# Patient Record
Sex: FEMALE | Race: WHITE
[De-identification: names, ages, dates, MRNs, and addresses within clinical notes are randomized per-mention and may not be internally consistent; named-entity substitution may affect disease eponyms.]

---

## 2020-08-13 ENCOUNTER — HOSPITAL ENCOUNTER (INPATIENT)
Dept: HOSPITAL 35 - SBH | Age: 75
LOS: 11 days | Discharge: NURSING, REHAB OR LONG-TERM CARE | DRG: 884 | End: 2020-08-24
Attending: PSYCHIATRY & NEUROLOGY | Admitting: PSYCHIATRY & NEUROLOGY
Payer: COMMERCIAL

## 2020-08-13 VITALS — DIASTOLIC BLOOD PRESSURE: 66 MMHG | SYSTOLIC BLOOD PRESSURE: 144 MMHG

## 2020-08-13 VITALS — DIASTOLIC BLOOD PRESSURE: 68 MMHG | SYSTOLIC BLOOD PRESSURE: 153 MMHG

## 2020-08-13 VITALS — DIASTOLIC BLOOD PRESSURE: 57 MMHG | SYSTOLIC BLOOD PRESSURE: 114 MMHG

## 2020-08-13 VITALS — WEIGHT: 87.81 LBS | HEIGHT: 61 IN | BODY MASS INDEX: 16.58 KG/M2

## 2020-08-13 DIAGNOSIS — F32.9: ICD-10-CM

## 2020-08-13 DIAGNOSIS — E44.0: ICD-10-CM

## 2020-08-13 DIAGNOSIS — F01.50: Primary | ICD-10-CM

## 2020-08-13 DIAGNOSIS — R45.851: ICD-10-CM

## 2020-08-13 DIAGNOSIS — Z79.82: ICD-10-CM

## 2020-08-13 DIAGNOSIS — F41.9: ICD-10-CM

## 2020-08-13 DIAGNOSIS — F29: ICD-10-CM

## 2020-08-13 DIAGNOSIS — I10: ICD-10-CM

## 2020-08-13 DIAGNOSIS — E78.5: ICD-10-CM

## 2020-08-13 DIAGNOSIS — Z20.828: ICD-10-CM

## 2020-08-13 DIAGNOSIS — Z79.899: ICD-10-CM

## 2020-08-13 LAB
ALBUMIN SERPL-MCNC: 3.9 G/DL (ref 3.4–5)
ALT SERPL-CCNC: 31 U/L (ref 30–65)
AST SERPL-CCNC: 22 U/L (ref 15–37)
BILIRUB DIRECT SERPL-MCNC: 0.1 MG/DL
BILIRUB SERPL-MCNC: 0.4 MG/DL (ref 0.2–1)
PROT SERPL-MCNC: 7.6 G/DL (ref 6.4–8.2)

## 2020-08-13 PROCEDURE — 10880: CPT

## 2020-08-13 NOTE — NUR
PATIENT WAS IN BED SLEEPING WHEN CARE ASSUMED. WHEN AWOKEN, PATIENT CAME OUT
TO DAY ROOM AMBULATING WITH ASSIST OF ROLLER WALKER, GAIT SLIGHTLY UNSTEADY.
HOSITALIST DR. UMANA CONSULTED, PATIENT WELCOME PACK GIVEN TO HER. APPETITE IS
GOOD, PATIENT IS EATING 50%-75% MEALS. PATIENT TOOK ALL MEDICATION WHOLE
WITHOUT DIFFICULTY. PATIENT IS ALERT, AND ORIENTED X 2-3, ABLE TO VOICE NEED.
PATIENT DENIES SUICIDAL/HOMICIDAL IDEATION, SHE RATED DEPRESSION/ANXIETY
9/10. PATIENT DENIES AUDITORY/VISUAL HALLUCINATION, DENIES HAVING PHYSICAL
PAIN. PATIENT IS IMPULSIVE, RESTLESS, WANTING STAFF TO CONTINIOUSLY WALK WITH
HER IN THE PITTS. THOUGHT PROCESS DISORGANIZED, AND RACING. PATIENT DID
PARTICIPATE IN GROUP THERAPY. NO SIGN OF ACUTE DISTRESS NOTED AT THIS TIME,
WILL MONITOR.
FOR SAFETY.

## 2020-08-13 NOTE — NUR
Patient arrived at 0430 am via stretcher from [facility]. Patients is alert and
oriented x3. Patient denies hi/si. Patient is in with anxiety and depression.
Patient is accepting and is here voluntarily to get medications adjusted.
Patient ambulates with a walker. Patient denies pain at this time. Patients
assessment shows stable vital signs. Patient is willing to participate in
activities. Patients consents signed. We will continue to monitor per hospital
policy.

## 2020-08-14 VITALS — SYSTOLIC BLOOD PRESSURE: 115 MMHG | DIASTOLIC BLOOD PRESSURE: 63 MMHG
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
## 2020-08-14 NOTE — NUR
DESIRAE [facility] with [facility]. She gave the fax number of
[phone]. DESIRAE asked if they had DPOA docs for pt and she said they did; she
will fax to DESIRAE. She also gave pt's DPOA info of Yoni Uribe (Bill) at
[phone].
 
DESIRAE contacted Jin and was told by his wife that he will call DESIRAE back.
 
DESIRAE team will continue to follow pt during her stay on this unit.

## 2020-08-14 NOTE — NUR
Assess due to low BMI of 16.6.  admitted to [facility] for anxiety, depression, and
acute psychosis.  Pt impulsive, possible wt loss 2-13 lb indicated prior
admission.  Has carb control diet order, however no indication in pts hx, not
on diabetic medications and no accuchecks.  Will change diet to regular.  Alb
wnl, has been eating % meals since admission.  Low nutrition risk and
will continue to follow wt trends, intake trends weekly.

## 2020-08-15 VITALS — SYSTOLIC BLOOD PRESSURE: 133 MMHG | DIASTOLIC BLOOD PRESSURE: 52 MMHG

## 2020-08-15 VITALS — SYSTOLIC BLOOD PRESSURE: 119 MMHG | DIASTOLIC BLOOD PRESSURE: 70 MMHG

## 2020-08-15 NOTE — NUR
0700 ASSUMED CARE OF PATIENT, PATIENT IN BED AT THAT TIME.
0800 PATIENT REFUSING TO GET OUT OF BED FOR BREAKFAST FOR PCA.
0830 WRITER IN TO ROOM ASKED PT TO COME OUT FOR BREAKFAST, PATIENT UP TO BR
VOIDED X1 THEN OUT TO DAYROOM TO EAT. PATIENT ATE 50% OF MEAL. PATIENT SITS IN
DAYROOM FOR ANOTHER 15 MIN AFTER EATING THEN BACK TO ROOM. WRITER ASSISTED
PATIENT IN CHOOSING FOOD FOR LUNCH AS SHE REFUSES TO EAT ANYTHING HARD.
PATIENT IN BED AND ASKS TO HAVE DOOR CLOSED AS MUCH AS POSSIBLE. NOISE
INCREASES PATIENT ANXIETY. NO C/O SI/HI/AH/VH. NO C/O PAIN. WILL CONTINUE TO
OBSERVE.

## 2020-08-15 NOTE — NUR
Assumed care of pt @ 1900. Pt calm et cooperative this shift. Took medications
whole without difficulty. Ambulates the halls with assistance of walker with
steady gait. VSWNL. Health assessment with no abnormalities noted at present
time. Denies SI/HI at present time. Socialized with peers in dayroom until HS.
Currently resting in bed with eyes closed. Will continue to monitor per
protocol.

## 2020-08-15 NOTE — EKG
[facility]
[address]
[place]   [address]                     ELECTROCARDIOGRAM REPORT      
_______________________________________________________________________________
 
Name:       WILLIS WAITE                  [facility]      ADM IN  
M.R.#:      [id number]                       Account #:      [id number]  
Admission:  20    Attend Phys:    Gino Starks DO
Discharge:              Date of Birth:  [birth year]  
                                                          Report #: [id number]
                                                                    [id number]
_______________________________________________________________________________
THIS REPORT FOR:  
 
cc:  VIDHYA HILL MD             
     Physician not on staff        
     Cory Orourke MD                                            ~
THIS REPORT FOR:   //name//                          
 
                          [facility]
                                       
Test Date:    2020               Test Time:    12:56:22
Pat Name:     WILLIS WAITE                Department:   
Patient ID:   [id number]            Room:         [facility]
Gender:       F                        Technician:   HUMBERTO GARCIA
:          1945               Requested By: Gino Starks
Order Number: [id number] MD:   Cory Orourke
                                 Measurements
Intervals                              Axis          
Rate:         84                       P:            20
OR:           161                      QRS:          -9
QRSD:         85                       T:            61
QT:           354                                    
QTc:          419                                    
                           Interpretive Statements
Sinus rhythm
No previous ECG available for comparison
Electronically Signed On 8- 11:35:33 CDT by Cory Orourke
[email or web address]
 
 
 
 
 
 
 
 
 
 
 
 
 
 
 
 
 
  <ELECTRONICALLY SIGNED>
   By: Cory Orourke MD        
  08/15/20     1135
D: 20 1256                           _____________________________________
T: 20 1256                           Cory Orourke MD          /KWASI

## 2020-08-15 NOTE — H
[facility]
[address]
[place]   [address]                     HISTORY AND PHYSICAL          
_______________________________________________________________________________
 
Name:       WILLIS WAITE                  Room #:         520A-A      ADM IN  
M.R.#:      [id number]                       Account #:      [id number]  
Admission:  20    Attend Phys:    Gino Starks DO
Discharge:              Date of Birth:  [birth year]  
                                                          Report #: [id number]
                                                                    [id number]   
_______________________________________________________________________________
THIS REPORT FOR:  
 
cc:  VIDHYA HILL MD             
     Physician not on staff                                               
     Gino Starks DO                                        ~
CC: Gino Starks
    Physician staff
    VIDHYA HILL
 
DATE OF SERVICE:  2020
 
 
INPATIENT PSYCHIATRIC EVALUATION
 
ATTENDING PSYCHIATRIST:  Gino Starks DO
 
MEDICAL CONSULTANT:  Stu Christianson MD and his team of hospitalist.
 
SOURCES OF INFORMATION:  Records from [facility], interview with the
patient.  The patient has a brother, Yoni Uribe, will
get an additional information
from who is her DPOA.
 
CHIEF COMPLAINT:  SI statements.
 
HISTORY OF PRESENT ILLNESS:  This is a 75-year-old  female who is in
assisted living in [facility].  EMS was called due to increase in anxiety and
making suicidal ideation statements.  She later denied to the Emergency Room
stating she is to start to make those kinds of statements.  It is noted she has
a long history of anxiety and depression.  She reports numerous psychiatric
hospitalizations though cannot recall where.  She knew the day was Thursday,
stated the year was , she knew it was August.  She had trouble remembering
fair amount of historic details.  Report from the nursing home, the patient
making suicidal threats to staff member, which was unable to be corroborated in
the ER.  She denied visual or auditory hallucinations.  No previous history of
suicide attempts.  She denied any chest pain, abdominal pain, shortness of
breath, fevers or chills.  Denies any illicit drug use and states she drinks 1-2
times a week.  Associated symptoms listed as anxiety.
 
PAST MEDICAL HISTORY:  Includes hyperlipidemia, hypertension.
 
PSYCHIATRIC HISTORY:  Anxiety, depression, psychosis.
 
PAST SURGICAL HISTORY:  Cyst removal.
 
SOCIAL HISTORY:  Denies ever smoking.  She denied alcohol, drug use to me. 
 
 
 
[facility]
[address]
[place]   [address]                     HISTORY AND PHYSICAL          
_______________________________________________________________________________
 
Name:       WILLIS WAITE JO                  Room #:         520A-A      ADM IN  
M.R.#:      [id number]                       Account #:      [id number]  
Admission:  20    Attend Phys:    Gino Starks DO
Discharge:              Date of Birth:  [birth year]  
                                                          Report #: [id number]
                                                                    [id number]   
_______________________________________________________________________________
Actually, she denied abuse of those, but does states she drinks 1 glass of wine
per week.
 
REVIEW OF SYSTEMS:  From the ER,
CONSTITUTIONAL:  Negative for chills or fever.
HEENT:  Negative for congestion.
EYES:  Negative for visual disturbances.
RESPIRATORY:  Negative for shortness of breath.
CARDIOVASCULAR:  Negative for chest pain.
GASTROINTESTINAL:  Negative for abdominal pain, nausea, vomiting.
GENITOURINARY:  Negative for dysuria or hematuria.
SKIN:  Negative for rash.
ALLERGIC/IMMUNOLOGIC:  Negative for immunocompromised state.
NEUROLOGIC:  Negative for headaches.
PSYCHIATRIC:  Positive for dysphoric mood, being anxious.
All other review of systems are negative on brief 10-point.
 
PHYSICAL EXAMINATION:  Grossly normal.
 
HOME MEDICATIONS:  Noted to be acetaminophen, alprazolam 0.25 mg, amlodipine 5
mg p.o. daily, aspirin 81 mg p.o. daily, atorvastatin 20 mg p.o. daily, calcium
with vitamin D 500 mg/200IU I think it is 3 times a day, clonazepam 0.5 mg
tablet, deutetrabenazine, so she has been treated for dyskinesia 12 mg p.o.
daily, Depakote 125 mg delayed release  twice a day, escitalopram 10 mg p.o.
daily, lisinopril oral daily, omega 3 fish oil 1000 mg capsule, Oxybutynin and
Seroquel.
 
The patient since here she is going to be discharged on a safety plan and return
back to facility; however, after reviewing from the nursing facility, they
recommended inpatient psychiatric hospitalization.  [place]
follows, this was done
by JENNIFER Kapoor at [facility].  She has had
increased hallucinations, anxiety, agitation, making statements that her anxiety
and depression are so bad that she wants to die.  She has repeatedly cancelled
her psychiatric appointments.  She is also refusing to eat and had a significant
weight loss.  She also has refused to go to any of her medical appointments. 
She has an extensive psychiatric history and needs to have her meds adjusted.  I
am concerned for her safety as she is in her apartment alone for extended
periods of time.  It is imperative to maintain
safety that she is admitted to an
inpatient psychiatric facility for medication adjustment and evaluation.
 
LABORATORY DATA:  Urine drug screen showed opiate,  otherwise not detected. 
Urinalysis was negative.  Electrolytes:  Sodium 139, potassium 3.7, chloride
103, bicarbonate 29, anion gap 7, calcium 9, glucose 122, BUN 17, creatinine
0.6, GFR female non-African-American 97.  Alcohol negative.  CBC:  White count
 
 
 
[facility]
[address]
[place]   [address]                     HISTORY AND PHYSICAL          
_______________________________________________________________________________
 
Name:       WILLIS WAITE                  Room #:         520A-A      ADM IN  
.R.#:      [id number]                       Account #:      [id number]  
Admission:  20    Attend Phys:    Gino Starks, DO
Discharge:              Date of Birth:  [birth year]  
                                                          Report #: [id number]
                                                                    [id number]   
_______________________________________________________________________________
7.02, H and H 12.4 and 37, platelet count 245.  There was a psychiatric
assessment that appears to have been done, but not offering a lot more
information than got from the physical.  She reported she has been at her
facility for 3 years, feel safe there.  She is a resident neighbor across [address]
which
she considers a friend.  She sleeps okay.  She has decreased appetite, poor
eyesight, misses reading books.  Actually, I did not get where she was born and
raised.  She was  in the early 70s,  ,  after that.  No
children.  She has 2 brothers.  Forgot to ask level of education.
 
VITAL SIGNS:  Today, temperature 36.1, pulse 62, respirations 18, /57, O2
sat 97%.  We will go ahead and order hepatic function panel as this was not on
the outside records that will give us albumin and LFTs.  I will also order B12
and vitamin D.  She does appear to be cognitively impaired so I will add on the
syphilis antibody.
GENERAL:  She ambulates with walker.  She does have dyskinesia in her 
left foot.
MUSCULOSKELETAL:  Abnormal gait, dyskinesia evident of the left foot, "curling
toes."
 
MENTAL STATUS EXAMINATION:  This is a well-developed, ill-appearing 
female appearing at least stated age.  Attention limited.  Concentration
limited.  Denied SI or HI.  Endorses anxiety.  Denied auditory, visual, or
tactile hallucinations.  Memory appears to be impaired.  SLUMS deferred.  Mood
dysphoric, anxious, restricted.  Insight limited.  Judgment limited.  Fund of
knowledge below average.
 
FORMULATION:  A 75-year-old  female set in for SI.
 
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
degree with psychotic features ,
unspecified anxiety, rule out major neurocognitive disorder.
 
PLAN:  Evaluate, stabilize, obtain collaterals.
 
ESTIMATED LENGTH OF STAY:  10-14 days.
 
B12, vitamin D, syphilis antibody, hepatic function panel ordered.  I will
review and see if she has had any neuroimaging if we are going to need to do a
full dementia workup.
 
STRENGTHS:  She is insured, has DPOA.
 
WEAKNESSES:  Advancing age, multiple medical problems.
 
We will start her on Seroquel.  She requested to go easy, so start her on 12.5
 
 
 
[facility]
[address]
[place]   [address]                     HISTORY AND PHYSICAL          
_______________________________________________________________________________
 
Name:       WILLIS WAITE JO                  Room #:         520A-A      ADM IN  
..#:      [id number]                       Account #:      [id number]  
Admission:  20    Attend Phys:    Gino Starks DO
Discharge:              Date of Birth:  [birth year]  
                                                          Report #: [id number]
                                                                    [id number]   
_______________________________________________________________________________
mg 3 times a day.  I discontinued the Depakote.  We do not have
Deutetrabenazine
on
formulary, so I do not think that can be treated here. 
.  The hospitalist wanted her back on Norvasc, which is fine and a
statin, Pepcid and aspirin.
 
Approximately 60 minutes spent on this case, greater than 50% of time spent on
review of records, coordination of care.
 
 
 
 
 
 
 
 
 
 
 
 
 
 
 
 
 
 
 
 
 
 
 
 
 
 
 
 
 
 
 
 
 
 
 
  <ELECTRONICALLY SIGNED>
   By: Gino Starks DO        
  08/15/20     1500
D: 20 1303                           _____________________________________
T: 20 1338                           Gino Starks,           /nt

## 2020-08-16 VITALS — DIASTOLIC BLOOD PRESSURE: 75 MMHG | SYSTOLIC BLOOD PRESSURE: 153 MMHG

## 2020-08-16 VITALS — SYSTOLIC BLOOD PRESSURE: 133 MMHG | DIASTOLIC BLOOD PRESSURE: 69 MMHG

## 2020-08-16 NOTE — NUR
Assumed care of pt @ 1900. Pt calm et cooperative this shift. Pt appeared to
be inordinately agitated this shift et anxiety level was to the extreme
demonstratively. Pt was seen quivering at the table in the dinig room with the
noise level appearing to be the cause. When pt was assisted to room to lie
down, she became agitated when another peer entered the room before being
escorted out. Took medications whole without difficulty. Ambulates with
assistance of walker with slow et steady gait. VSWNL. Health assessment with
no abnormalities noted at present time. Denies SI/HI at present time.
Currently resting in bed with eyes closed. Will continue to monitor per
protocol.

## 2020-08-16 NOTE — NUR
ASSUMED PT CARE AROUND 1930. AXOX1. VSS. NO S/S ACUTE DISTRESS NOTED OR
REPORTED AT THIS TIME. CARE WITLL BE TRABSFERRED TO A DIFFERENT NURSE SHORTLY

## 2020-08-17 VITALS — SYSTOLIC BLOOD PRESSURE: 131 MMHG | DIASTOLIC BLOOD PRESSURE: 61 MMHG

## 2020-08-17 VITALS — DIASTOLIC BLOOD PRESSURE: 67 MMHG | SYSTOLIC BLOOD PRESSURE: 123 MMHG

## 2020-08-17 NOTE — NUR
0700 ASSUMED CARE OF PATIENT, PATIENT IN BED AT THAT TIME. PATIENT REFUSES TO
GET OUT OF BED FOR BREAKFAST AT 0800. WRITER TO ROOM AND ASKS PATIENT TO
COMEOUT FOR BREAKFAST, PATIENT UP AMB WITH WALKER WITH SHUFFLED GAIT. PATIENT
REQUESTING DIET CHANGE AND AGGITATED.
MEDICATION TAKEN WHOLE WITHOUT DIFFICULTY. PATIENT RETURNS TO ROOM AFTER MEALS
AND STAYS IN ROOM MOST OF THE DAY. PATIENT IS CALM AND COOPERATIVE.

## 2020-08-17 NOTE — NUR
Dr. Starks asked DESIRAE to get level of care recommendatons for pt from
[facility] DESIRAE spoke with Rojas who assists the pt. Her
recommendation was that pt would benefit from an [facility] care. level of assisted.
living. DESIRAE provided this update to Dr. Starks. DESIRAE will discuss this also
with pt's DPOA.
 
SW team will continue to follow pt during her stay on this unit.

## 2020-08-18 VITALS — SYSTOLIC BLOOD PRESSURE: 133 MMHG | DIASTOLIC BLOOD PRESSURE: 70 MMHG

## 2020-08-18 VITALS — SYSTOLIC BLOOD PRESSURE: 109 MMHG | DIASTOLIC BLOOD PRESSURE: 54 MMHG
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
## 2020-08-18 NOTE — NUR
ASSUMED PT CARE AROUND 1930. AXOX2. VSS. NO S/S ACUTE DISTRESS NOTED OR
REPORTED AT THIS TIME. WILL CONT TO MONITOR ANY CHANGERS IN CONDITION.

## 2020-08-18 NOTE — NUR
0700 ASSUMED CARE OF PATIENT, PATIENT LYING IN BED ASLEEP. PATIENT TO DAYROOM
AMB WITH WALKER. PATIENT C/O WEAKNESS AND NOT BEING ABLE TO WALK WELL DUE TO
RESTLESS LEG SYNDROME. PATIENT AT TABLE EATING BREAKFAST, PATIENT ATE 45% OF
MEAL. 0820 MEDICATIONS GIVEN WHOLE WITHOUT DIFFICULTY.
0910 PATIENT PRESENT AT AM GROUP. PATIENT SITTING ON COUCH WATCHING TV.
PATIENT STATES INCREASE ANXIETY DUE TO MULTIPLE EKG'S BEING DONE. PATIENT
CONFUSED AS ONLY ONE EKG HAS BEEN DONE. PATIENT STATES HER INSURANCE WILL NOT
COVER THAT MANY TESTS. DR UMANA HERE TO SEE PATIENT.
VS THIS AM BP-133/70 P-103
R- 22 TEMP-98.5 O2 SATS- 96%. WILL CONTINUE TO OBSERVE

## 2020-08-18 NOTE — NUR
1515 PATIENT TO SHOWER ROOM, PATIENT IN TUB WITH ASSIST X2. PATIENT TO ROOM
AND WORRIED ABOUT WET HAIR. WRITER ASSISTED WITH DRYING PT HAIR. PATIENT IN
YELLOW IVET AND YELLOW SOCKS ON.

## 2020-08-18 NOTE — NUR
DESIRAE received a vm from Jenni asking for a retun call to [phone]. DESIRAE
contacted her and did not receive an answer. DESIRAE lft msg.
 
SW team will continue to follow pt during her stay on this unit.

## 2020-08-19 VITALS — DIASTOLIC BLOOD PRESSURE: 67 MMHG | SYSTOLIC BLOOD PRESSURE: 130 MMHG

## 2020-08-19 VITALS — DIASTOLIC BLOOD PRESSURE: 52 MMHG | SYSTOLIC BLOOD PRESSURE: 121 MMHG

## 2020-08-19 NOTE — NUR
Assumed care 0700.  Very shaky, uses walker though sometimes walks away from
it. Call received from older brother in [place], [place] wanting to know about her
future placement.  Call from brother transferred to Radha RODRIGUEZ for
clarification.

## 2020-08-19 NOTE — NUR
DESIRAE faxed updates to [facility].
 
DESIRAE team will continue to follow pt during her stay on this unit.

## 2020-08-19 NOTE — NUR
Assumed pt care at 1930. No sign distress noted. Pt is alert and confused.
Denies any pain. Fall precaution in place. Pt is stable and is seen sitting in
the common room. Assessment completed and documented. Scheduled meds
administered to pt. No acute events overnight. Continue to monitor. No further
needs at this time.

## 2020-08-20 VITALS — SYSTOLIC BLOOD PRESSURE: 130 MMHG | DIASTOLIC BLOOD PRESSURE: 67 MMHG

## 2020-08-20 VITALS — SYSTOLIC BLOOD PRESSURE: 137 MMHG | DIASTOLIC BLOOD PRESSURE: 68 MMHG

## 2020-08-20 VITALS — SYSTOLIC BLOOD PRESSURE: 148 MMHG | DIASTOLIC BLOOD PRESSURE: 63 MMHG

## 2020-08-20 NOTE — NUR
Jin Uribe left DESIRAE a msg asking for a return call. DESIRAE called. No answer. Lft
msg.
 
 team will continue to follow pt during her stay on this unit.
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
## 2020-08-20 NOTE — NUR
Assumed care on 08/20/20 @ 19:15, seated in the day room, A&Ox 3-4, paranoid
ideation noted. Cooperated with assessment, HRRR, Lungs diminished ABD has
bowel sounds present. Reports BM today. Uses a walker and is a fall risk,
feels that she is fairly steady when using a walker. Reports Pain in her mouth
as a 4/10, and reports sometimes has pain in her knees, but now it is okay.
Takes meds whole with water. Noted to be trembling in upper extremities less
today. Retired to bed @ HS, bed in low position, bed alarm set. Will continue
to monitor as per protocol.

## 2020-08-20 NOTE — NUR
Assumed care on 08/19/20 @ 19:30, seated on the couch, fussing about having to
sit on a chair alarm. Accuses staff of punishing her and making her sit on the
chair alarm. Education provided regarding safety protocol. Patient points to
other patients saying, they are not being made to sit on a pad.
Orientd x 4, however paranoid in ideation noted. As to why she is in the
hospital, responds that it is to have her meds checked out, but we wont help
her. Refused to speak to family who called,
and when asked what she would like to have staff report to family as
to why she did not wish to speak to family, would not respond, but then said,
what are you going to tell him? Called Anant @ [phone],and his line
was busy. Takes meds whole with water and retires to bed @ HS.
Bed in low Position, bed alarm set, will
continue to monitor as per protocol for patient safety and comfort.

## 2020-08-20 NOTE — NUR
0700 ASSUMED CARE OF PATIENT. PATIENT IN BED AT THAT TIME. WRITER TALKS TO PT
REGARDING BREAKFAST AND ORDERS PATIENT DESIRED MEAL. PATIENT AMB TO DAYROOM
USING WALKER WITH SLIGHT SHUFFLE. PATIENT NOTED WITH YELLOW SHIRT AND YELLOW
SOCK ON. PATIENT SITS AT TABLE WITH OTHERS AND COMMUNICATES WELL. PATIENT
PARANOID ABOUT HER MEAL THAT WAS ORDERED. REASSURED HER THAT HER MEAL WAS
ORDERED ON TIME. PATIENT % OF MEAL. MEDICATIONS TAKEN WHOLE WITHOUT
DIFFICULTY.
LS CLEAR, BS ACTIVE. NO C/O PAIN. PATIENT PRESENT IN AM GROUP. AFTER GROUP
PATIENT BACK TO ROOM TO REST.
PATIENT REQUESTS TO BE OFF SOFT DIET ORDER AND TO HAVE A REGULAR DIET. PATIENT
REQUESTS CHICKEN TENDERS WITH MEAL. DR BECKER CHANGED DIET AND PATIENT
RECIEVED WHAT SHE REQUESTED. PATIENT % OF DINNER. PATIENT IS NOTED TO
BE CALMER AT THAT TIME. PATIENT STATES "MEDICATIONS ARE HELPING MY ANXIETY".
WILL CONTINUE TO OBSERVE

## 2020-08-21 VITALS — DIASTOLIC BLOOD PRESSURE: 68 MMHG | SYSTOLIC BLOOD PRESSURE: 137 MMHG

## 2020-08-21 VITALS — SYSTOLIC BLOOD PRESSURE: 115 MMHG | DIASTOLIC BLOOD PRESSURE: 57 MMHG

## 2020-08-21 VITALS — SYSTOLIC BLOOD PRESSURE: 125 MMHG | DIASTOLIC BLOOD PRESSURE: 51 MMHG

## 2020-08-21 NOTE — NUR
0700 ASSUMED CARE OF PATIENT. PATIENT IN BED AT THAT TIME.
0800 PATIENT UP AMB TO DAYROOM, STOPS AT NURSES STATION TO ASK NURSE ABOUT HER
BREAKFAST MENU AND IF HER MEAL WAS ORDERED. PATIENT ASSURED MEAL WAS ORDERED.
PATIENT % OF MEAL. PATIENT ATTENDED GROUP THIS AM.
PATIENT NOTED WITH INCREASED ANXIETY . ATIVAN 1 MG PO GIVEN.

## 2020-08-21 NOTE — NUR
Assumed care on 08/21/20 @ 19:30. Cooperated with assessment, HRRR, Lungs
diminished, ABD N and reports BM today. Takes meds whole with water without
difficulty. Tylenol 500 for 5/10 General pain, follow up pain reports partial
relief of 3/10, PRN Lorazepam 1 mg provided for noted anxiety and agitation,
and cyclic worry. Retired to bed @ , at this writing, eyes are closed,
respirations even and unlabored Bed in low posiiton and bed alarm set. Will
continue to monitor for patient safety and comfort.

## 2020-08-22 VITALS — DIASTOLIC BLOOD PRESSURE: 66 MMHG | SYSTOLIC BLOOD PRESSURE: 143 MMHG

## 2020-08-22 VITALS — DIASTOLIC BLOOD PRESSURE: 70 MMHG | SYSTOLIC BLOOD PRESSURE: 145 MMHG

## 2020-08-22 VITALS — SYSTOLIC BLOOD PRESSURE: 143 MMHG | DIASTOLIC BLOOD PRESSURE: 66 MMHG

## 2020-08-22 VITALS — SYSTOLIC BLOOD PRESSURE: 125 MMHG | DIASTOLIC BLOOD PRESSURE: 51 MMHG

## 2020-08-22 NOTE — NUR
PATIENT OUT ON UNIT MOST OF THE DAY. AFFECT TENSE AND MOOD WORRISOME. MAKES
NEEDS KNOWN. WAS ABLE TO CONVEY TO STAFF WHAT SHE WANTED FOR BREAKFAST, LUNCH
AND DINNER. NERVOUS AND TREMORS EVIDENT. CLAIMS SLEPT WELL. TOOK MEDICATIONS
COMPLIANTLY.

## 2020-08-23 VITALS — DIASTOLIC BLOOD PRESSURE: 69 MMHG | SYSTOLIC BLOOD PRESSURE: 144 MMHG

## 2020-08-23 VITALS — SYSTOLIC BLOOD PRESSURE: 127 MMHG | DIASTOLIC BLOOD PRESSURE: 58 MMHG

## 2020-08-23 NOTE — NUR
PATIENT CARE ASSUMED AT 1900 - PATIENT IN BED WHEN APPROACHED - SHE WAS
QUESTIONED ON NIGHT AND STATED NOT GOOD - SHE WAS EMOTIONAL AND VERY
DISTRAUGHT - HOLDING HEAD AND DISPLAYING HELPLESSNESS. STAFF HAD TO ENCOURAGE
HER TO GET UP FOR BREAKFAST. WHEN ATTEMPTING TO CALM HER BEHAVIOR EXACERBATES
- OBSERVED PATIENT BEHAVIOR CALM INTERMITTENTLY AND THEN EXPRESSES ANXIETY
WHEN
ATTENTION GIVEN. TOOK MEDICATIONS COMPLIANTLY.  ATE 50 PER CENT OF HER
BREAKFAST.
APPEARS TO REACT EMOTIONALLY WHEN SURROUNDINGS LOUD OR OVERLY STIMULATING. NO
PRN GIVEN AT THIS TIME DUE TO NON CONSISTENT OUTBURSTS.

## 2020-08-23 NOTE — NUR
Assumed care of patient this pm shift. Patient appears anxious and sad.
Patient stated that she was depressed. When asked if the current medication
regimen is giving any relief to the depression patient stated that she did not
think so. Patient denies pain. Patient denies hi/si. Patient is continent of
bowel and bladder. Patient is considered a falls risk and has on a yellow
shirt. Patients affect is sad. Patients assessment shows no signs of acute
distress. Patient is medication adherent and takes medications whole with thin
fluids. Patient did not voice any new concerns. We will continue to monitor
per hospital protocol.

## 2020-08-24 VITALS — SYSTOLIC BLOOD PRESSURE: 134 MMHG | DIASTOLIC BLOOD PRESSURE: 66 MMHG

## 2020-08-24 NOTE — NUR
PT WA SOBSERVED LYING ON HER EBD AT START OF SHIFT.PT UP WITH A ROLLER WALKER
AMUALTES TO AND FROM HER ROOM TO THE NURSES STATION.PT REQUESTED FOR AND
RECEIVED MED FOR AGITATION.PT RESTING ON HER BED AT THIS TIME.PT REFUSING FOR
BED ALARM TO BE TURNED ON.PT EDUCATED ON THE IMPORTANCE OF IT.ALARM ON ,PT
NOT HAPPY ABOUT IT.REPORT GIVEN TO THE NURSE RELIEVING ME.

## 2020-08-24 NOTE — NUR
8313 PATIENT NOTED WITH INCREASED ANXIETY. DR BECKER ASKED PT TO SIT DOWN AS
PATIENT UNSTEADY WALKING WITH WALKER. [facility] WRITER TALKS TO PT AND
AMBULATES TO ROOM TO REST. PATIENT TO DAYROOM FOR LUNCH EATS WELL.
3610 PATIENT DC IN WC ACCOMPANIED BY STAFF TO VEHICLE FOR TRANSPORT TO
[facility]. PATIENT WITH BELONGING IN HAND.
REPORT CALLED TO [facility] @ 3007. RECIEVED CALL FROM BROTHER ASKING IF PT HAS
BEEN DC'D. WRITER SPOKE WITH BROTHER JODIE AND OBTAINED DC CONSENTS VIA
PHONE.

## 2020-08-24 NOTE — NUR
Assumed pt's care @ 2300. Pt continued to sleep well this shift. No new
complaints noted. Fall precaution in place.  Will continue to monitor.

## 2020-08-24 NOTE — NUR
DESIRAE faxed to [facility] pt's COVID results. DESIRAE confirmed with Jenni
that pt is still good to go to [facility] today at 1330.
 
SW team will continue to follow pt during her stay on this unit.

## 2020-08-24 NOTE — NUR
DESIRAE D/C NOTE
 
DESIRAE faxed discharge docs to [facility] DESIRAE will file fax with coverletter
in pt's hospital file.
 
No other needs for SW team to address at this time.

## 2020-08-24 NOTE — NUR
0700 ASSUMED CARE OF PATIENT, PATIENT IN BED AT THAT TIME. 0730 PT OUT TO
DESK FOR ASSISTANCE IN ORDERING BREAKFAST. PATIENT NOTED WITH INCREASED
ANXIETY, AT 0745 ATIVAN 1MG GIVEN PO. PATIENT TO DAYROOM FOR BREAKFAST AT
0800, PATIENT % OF MEAL. PATIENT PRESENT IN GROUP THIS AM.